# Patient Record
Sex: MALE | Race: WHITE
[De-identification: names, ages, dates, MRNs, and addresses within clinical notes are randomized per-mention and may not be internally consistent; named-entity substitution may affect disease eponyms.]

---
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
## 2023-07-22 ENCOUNTER — NON-APPOINTMENT (OUTPATIENT)
Age: 23
End: 2023-07-22

## 2024-09-04 ENCOUNTER — APPOINTMENT (OUTPATIENT)
Dept: ORTHOPEDIC SURGERY | Facility: CLINIC | Age: 24
End: 2024-09-04
Payer: COMMERCIAL

## 2024-09-04 VITALS
BODY MASS INDEX: 24.92 KG/M2 | DIASTOLIC BLOOD PRESSURE: 85 MMHG | HEIGHT: 72 IN | HEART RATE: 66 BPM | WEIGHT: 184 LBS | SYSTOLIC BLOOD PRESSURE: 137 MMHG

## 2024-09-04 DIAGNOSIS — M54.16 RADICULOPATHY, LUMBAR REGION: ICD-10-CM

## 2024-09-04 PROCEDURE — 99204 OFFICE O/P NEW MOD 45 MIN: CPT

## 2024-09-04 PROCEDURE — 72110 X-RAY EXAM L-2 SPINE 4/>VWS: CPT

## 2024-09-04 RX ORDER — METHYLPREDNISOLONE 4 MG/1
4 TABLET ORAL
Qty: 1 | Refills: 0 | Status: ACTIVE | COMMUNITY
Start: 2024-09-04 | End: 1900-01-01

## 2024-09-04 RX ORDER — GABAPENTIN 300 MG/1
300 CAPSULE ORAL
Qty: 30 | Refills: 2 | Status: ACTIVE | COMMUNITY
Start: 2024-09-04 | End: 1900-01-01

## 2024-09-04 NOTE — ADDENDUM
[FreeTextEntry1] :  I, Shannen Balderasd, acted solely as a scribe for Dr. Jin Carranza on this date 09/04/2024 .  All medical records entries made by the Scribe were at my Dr. Jin Carranza direction and personally dictated by me on 09/04/2024. I have reviewed the chart and agree that the record accurately reflects my personal performance of the history, physical exam, assessment and plan. I have also personally directed, reviewed, and agreed with the chart.

## 2024-09-04 NOTE — HISTORY OF PRESENT ILLNESS
[Pain Location] : pain [Lumbar] : lumbar region [Worsening] : worsening [___ days] : [unfilled] day(s) ago [Sitting] : worsened by sitting [Standing] : worsened by standing [8] : a current pain level of 8/10 [de-identified] :  A 24-Year-old male is here today for an initial visit for back pain. Patient has family hx (father) of back issues and arthritis. Patient was refereed to us by a physical therapist. The patient states that he was squatting at the gym and lifting 160 lbs., was on hist 4th set he went down and was unable to get up. He notes that yesterday he had severe low back. He states no radiating pain at this time. The patient currently takes Advil and Aleve to manage his pain. [de-identified] : Laying

## 2024-09-04 NOTE — PHYSICAL EXAM
[1+] : right patella 1+ [2+] : left ankle jerk 2+ [Antalgic] : antalgic [Wide-Based] : wide-based [LE] : Sensory: Intact in bilateral lower extremities [Normal RLE] : Right Lower Extremity: No scars, rashes, lesions, ulcers, skin intact [Normal LLE] : Left Lower Extremity: No scars, rashes, lesions, ulcers, skin intact [Acute Distress] : in acute distress [Obese] : not obese [Normal] : Oriented to person, place, and time, insight and judgement were intact and the affect was normal [de-identified] : Loss of Lumbar Lordosis Lyst to the left  Intensive paraspinal spasm worse on the right  Flexion 10 degrees to 40 degrees with pain  Extension in to -5 degrees Unable to lateral tilt Able to Heal and Toe walk with back pain  [de-identified] : Positive contralateral SLR test on the left  Positive SLR on the right  [de-identified] :  AP & lateral Flexion and Extension X-Rays 4 views of the Lumbar spine taken 09/04/2024 shows loss of disc height L5-S1, poor flexion and extension effort due to pian and significant lyst to the left without true scoliosis.

## 2024-09-04 NOTE — DISCUSSION/SUMMARY
[Medication Risks Reviewed] : Medication risks reviewed [2 Weeks] : in 2 weeks [de-identified] :  I have discussed the underlying pathophysiology of the patient's condition and X-ray findings.  I expressed to patient that her symptoms are consistent with herniated disc. I have prescribed the patient with a steroid pack ang gabapentin to take at night.  Activity modifications were reviewed with the patient at length.  I expressed to the patient at this time given the diminished right knee jerk, it would be in their best interest to obtain additional studies in the form of an MRI. I provided the patient with necessary prescription to obtain an MRI. The patient should follow up with in 1-2 weeks to review the results of the MRI & further assessment of his symptoms.

## 2024-09-05 RX ORDER — TIZANIDINE HYDROCHLORIDE 2 MG/1
2 CAPSULE ORAL
Qty: 30 | Refills: 0 | Status: ACTIVE | COMMUNITY
Start: 2024-09-05 | End: 1900-01-01

## 2024-09-09 ENCOUNTER — TRANSCRIPTION ENCOUNTER (OUTPATIENT)
Age: 24
End: 2024-09-09

## 2024-09-09 ENCOUNTER — APPOINTMENT (OUTPATIENT)
Dept: ORTHOPEDIC SURGERY | Facility: CLINIC | Age: 24
End: 2024-09-09
Payer: COMMERCIAL

## 2024-09-09 DIAGNOSIS — M51.26 OTHER INTERVERTEBRAL DISC DISPLACEMENT, LUMBAR REGION: ICD-10-CM

## 2024-09-09 PROCEDURE — 99214 OFFICE O/P EST MOD 30 MIN: CPT

## 2024-09-09 RX ORDER — FLUOCINOLONE ACETONIDE 0.11 MG/ML
0.01 OIL TOPICAL
Qty: 118 | Refills: 0 | Status: ACTIVE | COMMUNITY
Start: 2023-12-27

## 2024-09-09 NOTE — HISTORY OF PRESENT ILLNESS
[Pain Location] : pain [Lumbar] : lumbar region [Stable] : stable [5] : a current pain level of 5/10 [de-identified] : Patient is here today to review the recent results of an MRI for lumbar spine.  The patient continues to have symptoms pertaining to lower back. The patient reports no significant changes in their symptoms since their last visit. The patient continues to have spasms, numbness and tingling.

## 2024-09-09 NOTE — PHYSICAL EXAM
[LE] : Sensory: Intact in bilateral lower extremities [Normal] : Alert and in no acute distress [Obese] : not obese [de-identified] : Currently the patient does not supply a list.  Patient is ambulating more freely and without pain. [de-identified] : There is tenderness palpation over the greater trochanter and sciatic notch area of the left hip. [de-identified] : MRI Evaluation of the Lumbar spine taken on 09/09/2024 personnel show central and left-sided herniated disc at L5-S1 and disc degeneration and a central disc bulge/small herniation which is broad-based at L4-5.

## 2024-09-09 NOTE — DISCUSSION/SUMMARY
[Surgical risks reviewed] : Surgical risks reviewed [4 Weeks] : in 4 weeks [de-identified] :  I have discussed the underlying pathophysiology of the patient's condition. I highly recommend that the patient starts a course of physical therapy at this time. I have provided the patient with a detailed prescription for physical therapy.  The patient was offered epidural injections but at this time patient would like to defer pain management. The patient should follow up with me in 4 weeks for further assessment of symptoms.

## 2024-09-09 NOTE — ADDENDUM
[FreeTextEntry1] :  I, Shannen Balderasd, acted solely as a scribe for Dr. Jin Carranza on this date 09/09/2024 .  All medical records entries made by the Scribe were at my Dr. Jin Carranza direction and personally dictated by me on 09/09/2024. I have reviewed the chart and agree that the record accurately reflects my personal performance of the history, physical exam, assessment and plan. I have also personally directed, reviewed, and agreed with the chart.

## 2024-10-09 ENCOUNTER — APPOINTMENT (OUTPATIENT)
Dept: ORTHOPEDIC SURGERY | Facility: CLINIC | Age: 24
End: 2024-10-09
Payer: COMMERCIAL

## 2024-10-09 DIAGNOSIS — M51.26 OTHER INTERVERTEBRAL DISC DISPLACEMENT, LUMBAR REGION: ICD-10-CM

## 2024-10-09 PROCEDURE — 99214 OFFICE O/P EST MOD 30 MIN: CPT
